# Patient Record
Sex: MALE | Race: WHITE | NOT HISPANIC OR LATINO | Employment: FULL TIME | URBAN - METROPOLITAN AREA
[De-identification: names, ages, dates, MRNs, and addresses within clinical notes are randomized per-mention and may not be internally consistent; named-entity substitution may affect disease eponyms.]

---

## 2024-02-19 ENCOUNTER — OFFICE VISIT (OUTPATIENT)
Dept: ORTHOPEDIC SURGERY | Facility: CLINIC | Age: 64
End: 2024-02-19
Payer: COMMERCIAL

## 2024-02-19 VITALS — TEMPERATURE: 98 F | BODY MASS INDEX: 31.65 KG/M2 | WEIGHT: 226.1 LBS | HEIGHT: 71 IN

## 2024-02-19 DIAGNOSIS — M17.0 PRIMARY OSTEOARTHRITIS OF BOTH KNEES: ICD-10-CM

## 2024-02-19 DIAGNOSIS — R52 PAIN: Primary | ICD-10-CM

## 2024-02-19 PROCEDURE — 99204 OFFICE O/P NEW MOD 45 MIN: CPT | Performed by: ORTHOPAEDIC SURGERY

## 2024-02-19 PROCEDURE — 73562 X-RAY EXAM OF KNEE 3: CPT | Performed by: ORTHOPAEDIC SURGERY

## 2024-02-19 RX ORDER — TAMSULOSIN HYDROCHLORIDE 0.4 MG/1
CAPSULE ORAL
COMMUNITY
Start: 2019-02-18

## 2024-02-19 RX ORDER — LOSARTAN POTASSIUM 100 MG/1
TABLET ORAL
COMMUNITY
Start: 2021-01-18

## 2024-02-19 NOTE — PROGRESS NOTES
Patient Name: ABIEL RESENDIZ   YOB: 1960  Referring Primary Care Physician: Julissa Bermudez, APRN  BMI: Body mass index is 31.53 kg/m².    Chief Complaint:    Chief Complaint   Patient presents with    Left Knee - Pain, Initial Evaluation    Right Knee - Pain, Initial Evaluation        HPI:     ABIEL RESENDIZ is a 63 y.o. male who presents today for evaluation of   Chief Complaint   Patient presents with    Left Knee - Pain, Initial Evaluation    Right Knee - Pain, Initial Evaluation   .  Abiel is seen today complaining of bilateral knee pain with the right bother more than the left.  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  He says he was complaining of his knees and was referred to orthopedics back in the fall.  Family encouraged him to come see me.  He says at worst his pain is 5-7.  He works for [employer] and he does a lot of traveling.  Bothers him more with the more steps etc. he gets send it hurts him go up and down stairs.  He does try to exercise with cardio for at least 30 minutes a day.      Subjective   Medications:   Home Medications:  Current Outpatient Medications on File Prior to Visit   Medication Sig    losartan (COZAAR) 100 MG tablet     tamsulosin (FLOMAX) 0.4 MG capsule 24 hr capsule      No current facility-administered medications on file prior to visit.     Current Medications:  Scheduled Meds:  Continuous Infusions:No current facility-administered medications for this visit.    PRN Meds:.    I have reviewed the patient's medical history in detail and updated the computerized patient record.  Review and summarization of old records includes:    Past Medical History:   Diagnosis Date    Knee swelling     Throat cancer       History reviewed. No pertinent surgical history.     Social History     Occupational History    Not on file   Tobacco Use    Smoking status: Never     Passive exposure: Never    Smokeless tobacco: Never   Vaping Use     "Vaping Use: Never used   Substance and Sexual Activity    Alcohol use: Yes     Alcohol/week: 4.0 standard drinks of alcohol     Types: 2 Cans of beer, 2 Shots of liquor per week    Drug use: Never    Sexual activity: Yes     Partners: Female     Birth control/protection: Same-sex partner      Social History     Social History Narrative    Not on file      History reviewed. No pertinent family history.    ROS: 14 point review of systems was performed and all other systems were reviewed and are negative except for documented findings in HPI and today's encounter.     Allergies: No Known Allergies  Constitutional:  Denies fever, shaking or chills   Eyes:  Denies change in visual acuity   HENT:  Denies nasal congestion or sore throat   Respiratory:  Denies cough or shortness of breath   Cardiovascular:  Denies chest pain or severe LE edema   GI:  Denies abdominal pain, nausea, vomiting, bloody stools or diarrhea   Musculoskeletal:  Numbness, tingling, pain, or loss of motor function only as noted above in history of present illness.  : Denies painful urination or hematuria  Integument:  Denies rash, lesion or ulceration   Neurologic:  Denies headache or focal weakness  Endocrine:  Denies lymphadenopathy  Psych:  Denies confusion or change in mental status   Hem:  Denies active bleeding    OBJECTIVE:  Physical Exam: 63 y.o. male  Wt Readings from Last 3 Encounters:   02/19/24 103 kg (226 lb 1.6 oz)     Ht Readings from Last 1 Encounters:   02/19/24 180.3 cm (71\")     Body mass index is 31.53 kg/m².  Vitals:    02/19/24 0831   Temp: 98 °F (36.7 °C)     Vital signs reviewed.     General Appearance:    Alert, cooperative, in no acute distress                  Eyes: conjunctiva clear  ENT: external ears and nose atraumatic  CV: no peripheral edema  Resp: normal respiratory effort  Skin: no rashes or wounds; normal turgor  Psych: mood and affect appropriate  Lymph: no nodes appreciated  Neuro: gross sensation " intact  Vascular:  Palpable peripheral pulse in noted extremity  Musculoskeletal Extremities: Exam shows pleasant man he has swelling crepitation some synovitis in his knees with some medial joint line tenderness.  It is not severe.  He is able to transfer and ambulate he has little pseudolaxity.  His hips are noncontributory.    Radiology:   AP and lateral 40 degree PA x-ray bilateral knees taken the office today for complaints of pain  With some views available show fairly severe radiographic tricompartmental arthritis slightly worse on the right than the left.      Assessment:     ICD-10-CM ICD-9-CM   1. Pain  R52 780.96   2. Primary osteoarthritis of both knees  M17.0 715.16        MDM/Plan:   The diagnosis(es), natural history, pathophysiology and treatment for diagnosis(es) were discussed. Opportunity given and questions answered.  Biomechanics of pertinent body areas discussed.  When appropriate, the use of ambulatory aids discussed.    Biomechanics of pertinent body areas discussed.  When appropriate, the use of ambulatory aids discussed.  BMI:  The concept of BMI body mass index and its importance and implications discussed.    EXERCISES:  Advice on benefits of, and types of regular/moderate exercise pertaining to orthopedic diagnosis(es).  MEDICATIONS:  The risks, benefits, warnings,side effects and alternatives of medications discussed.  Inflammation/pain control; with cold, heat, elevation and/or liniments discussed as appropriate  MEDICAL RECORDS reviewed from other provider(s) for past and current medical history pertinent to this complaint.  He says he can certainly live with what he has.  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  Not bother him enough trying cortisone injection we can certainly do that.  Went over the evidence based treatments and answer questions about some of the other properly advertised treatments that lack good evidence.  He was happy to get this information  I told him surgery indication will be pain that is affecting quality of his life despite adequate trial of nonoperative therapy which she is really not had apparently the last orthopedic surgeon he said's discount is went straight to surgery recommendation he was not ready for that because what bothers her enough.    2/19/2024    Dictated utilizing Dragon dictation

## 2024-02-20 ENCOUNTER — PATIENT ROUNDING (BHMG ONLY) (OUTPATIENT)
Dept: ORTHOPEDIC SURGERY | Facility: CLINIC | Age: 64
End: 2024-02-20
Payer: COMMERCIAL

## 2024-02-20 NOTE — PROGRESS NOTES
A [facility] Message has been sent to the patient [place] [facility] [place]